# Patient Record
Sex: MALE | Race: WHITE | URBAN - NONMETROPOLITAN AREA
[De-identification: names, ages, dates, MRNs, and addresses within clinical notes are randomized per-mention and may not be internally consistent; named-entity substitution may affect disease eponyms.]

---

## 2023-03-01 ENCOUNTER — OUT OF OFFICE VISIT (OUTPATIENT)
Dept: URBAN - NONMETROPOLITAN AREA MEDICAL CENTER 3 | Facility: MEDICAL CENTER | Age: 53
End: 2023-03-01
Payer: COMMERCIAL

## 2023-03-01 DIAGNOSIS — E80.6 ABNORMAL BILIRUBIN TEST: ICD-10-CM

## 2023-03-01 DIAGNOSIS — D64.89 ANEMIA DUE TO OTHER CAUSE: ICD-10-CM

## 2023-03-01 DIAGNOSIS — K70.31 ALCOHOLIC CIRRHOSIS OF LIVER WITH ASCITES: ICD-10-CM

## 2023-03-01 DIAGNOSIS — K92.1 ACUTE MELENA: ICD-10-CM

## 2023-03-01 PROCEDURE — 99223 1ST HOSP IP/OBS HIGH 75: CPT | Performed by: STUDENT IN AN ORGANIZED HEALTH CARE EDUCATION/TRAINING PROGRAM

## 2023-03-01 PROCEDURE — G8427 DOCREV CUR MEDS BY ELIG CLIN: HCPCS | Performed by: STUDENT IN AN ORGANIZED HEALTH CARE EDUCATION/TRAINING PROGRAM

## 2023-03-02 ENCOUNTER — OUT OF OFFICE VISIT (OUTPATIENT)
Dept: URBAN - NONMETROPOLITAN AREA MEDICAL CENTER 3 | Facility: MEDICAL CENTER | Age: 53
End: 2023-03-02
Payer: COMMERCIAL

## 2023-03-02 DIAGNOSIS — D64.89 ANEMIA DUE TO OTHER CAUSE: ICD-10-CM

## 2023-03-02 DIAGNOSIS — K70.11 ALCOHOLIC HEPATITIS WITH ASCITES: ICD-10-CM

## 2023-03-02 DIAGNOSIS — K70.31 ALCOHOLIC CIRRHOSIS OF LIVER WITH ASCITES: ICD-10-CM

## 2023-03-02 DIAGNOSIS — K92.1 ACUTE MELENA: ICD-10-CM

## 2023-03-02 PROCEDURE — 99232 SBSQ HOSP IP/OBS MODERATE 35: CPT | Performed by: STUDENT IN AN ORGANIZED HEALTH CARE EDUCATION/TRAINING PROGRAM

## 2023-03-04 ENCOUNTER — OUT OF OFFICE VISIT (OUTPATIENT)
Dept: URBAN - NONMETROPOLITAN AREA MEDICAL CENTER 3 | Facility: MEDICAL CENTER | Age: 53
End: 2023-03-04
Payer: COMMERCIAL

## 2023-03-04 DIAGNOSIS — K70.31 ALCOHOLIC CIRRHOSIS OF LIVER WITH ASCITES: ICD-10-CM

## 2023-03-04 DIAGNOSIS — D64.89 ANEMIA DUE TO OTHER CAUSE: ICD-10-CM

## 2023-03-04 DIAGNOSIS — K92.1 ACUTE MELENA: ICD-10-CM

## 2023-03-04 DIAGNOSIS — K70.11 ALCOHOLIC HEPATITIS WITH ASCITES: ICD-10-CM

## 2023-03-04 PROCEDURE — 99232 SBSQ HOSP IP/OBS MODERATE 35: CPT | Performed by: STUDENT IN AN ORGANIZED HEALTH CARE EDUCATION/TRAINING PROGRAM

## 2023-03-06 ENCOUNTER — OUT OF OFFICE VISIT (OUTPATIENT)
Dept: URBAN - NONMETROPOLITAN AREA MEDICAL CENTER 3 | Facility: MEDICAL CENTER | Age: 53
End: 2023-03-06
Payer: COMMERCIAL

## 2023-03-06 DIAGNOSIS — D64.89 ANEMIA DUE TO OTHER CAUSE: ICD-10-CM

## 2023-03-06 DIAGNOSIS — K70.11 ALCOHOLIC HEPATITIS WITH ASCITES: ICD-10-CM

## 2023-03-06 DIAGNOSIS — I85.10 ESOPH VARICE OTHER DIS: ICD-10-CM

## 2023-03-06 DIAGNOSIS — K70.31 ALCOHOLIC CIRRHOSIS OF LIVER WITH ASCITES: ICD-10-CM

## 2023-03-06 PROCEDURE — 99232 SBSQ HOSP IP/OBS MODERATE 35: CPT | Performed by: INTERNAL MEDICINE

## 2023-03-07 ENCOUNTER — OUT OF OFFICE VISIT (OUTPATIENT)
Dept: URBAN - NONMETROPOLITAN AREA MEDICAL CENTER 3 | Facility: MEDICAL CENTER | Age: 53
End: 2023-03-07
Payer: COMMERCIAL

## 2023-03-07 DIAGNOSIS — K76.6 CLINICALLY SIGNIFICANT PORTAL HYPERTENSION: ICD-10-CM

## 2023-03-07 DIAGNOSIS — I85.10 ESOPH VARICE OTHER DIS: ICD-10-CM

## 2023-03-07 DIAGNOSIS — K31.89 ACQUIRED DEFORMITY OF DUODENUM: ICD-10-CM

## 2023-03-07 PROCEDURE — 43235 EGD DIAGNOSTIC BRUSH WASH: CPT | Performed by: INTERNAL MEDICINE

## 2023-03-21 ENCOUNTER — TELEPHONE ENCOUNTER (OUTPATIENT)
Dept: URBAN - NONMETROPOLITAN AREA CLINIC 4 | Facility: CLINIC | Age: 53
End: 2023-03-21

## 2023-03-27 ENCOUNTER — TELEPHONE ENCOUNTER (OUTPATIENT)
Dept: URBAN - NONMETROPOLITAN AREA CLINIC 4 | Facility: CLINIC | Age: 53
End: 2023-03-27

## 2023-03-28 ENCOUNTER — WEB ENCOUNTER (OUTPATIENT)
Dept: URBAN - NONMETROPOLITAN AREA CLINIC 4 | Facility: CLINIC | Age: 53
End: 2023-03-28

## 2023-03-28 ENCOUNTER — OFFICE VISIT (OUTPATIENT)
Dept: URBAN - NONMETROPOLITAN AREA CLINIC 4 | Facility: CLINIC | Age: 53
End: 2023-03-28
Payer: COMMERCIAL

## 2023-03-28 ENCOUNTER — LAB OUTSIDE AN ENCOUNTER (OUTPATIENT)
Dept: URBAN - NONMETROPOLITAN AREA CLINIC 4 | Facility: CLINIC | Age: 53
End: 2023-03-28

## 2023-03-28 VITALS
WEIGHT: 249 LBS | DIASTOLIC BLOOD PRESSURE: 85 MMHG | HEIGHT: 71 IN | TEMPERATURE: 97.7 F | HEART RATE: 106 BPM | BODY MASS INDEX: 34.86 KG/M2 | SYSTOLIC BLOOD PRESSURE: 132 MMHG

## 2023-03-28 DIAGNOSIS — K70.11 ALCOHOLIC HEPATITIS WITH ASCITES: ICD-10-CM

## 2023-03-28 DIAGNOSIS — I85.00 ESOPHAGEAL VARICES WITHOUT BLEEDING, UNSPECIFIED ESOPHAGEAL VARICES TYPE: ICD-10-CM

## 2023-03-28 DIAGNOSIS — K70.31 ALCOHOLIC CIRRHOSIS OF LIVER WITH ASCITES: ICD-10-CM

## 2023-03-28 PROBLEM — 14223005: Status: ACTIVE | Noted: 2023-03-28

## 2023-03-28 PROBLEM — 420054005: Status: ACTIVE | Noted: 2023-03-28

## 2023-03-28 PROBLEM — 1082611000119101: Status: ACTIVE | Noted: 2023-03-28

## 2023-03-28 PROCEDURE — 99215 OFFICE O/P EST HI 40 MIN: CPT | Performed by: PHYSICIAN ASSISTANT

## 2023-03-28 RX ORDER — PANTOPRAZOLE SODIUM 40 MG/1
1 TABLET TABLET, DELAYED RELEASE ORAL ONCE A DAY
Qty: 30 TABLET | Refills: 5

## 2023-03-28 RX ORDER — PREDNISOLONE ORAL 15 MG/5ML
5 ML IN THE MORNING WITH FOOD OR MILK SOLUTION ORAL ONCE A DAY
Status: ACTIVE | COMMUNITY

## 2023-03-28 RX ORDER — SPIRONOLACTONE 50 MG/1
1 TABLET TABLET, FILM COATED ORAL ONCE A DAY
Status: ACTIVE | COMMUNITY

## 2023-03-28 RX ORDER — PANTOPRAZOLE SODIUM 40 MG/1
1 TABLET TABLET, DELAYED RELEASE ORAL ONCE A DAY
Status: ACTIVE | COMMUNITY

## 2023-03-28 RX ORDER — PREDNISOLONE ORAL 15 MG/5ML
10.125 ML ONCE A DAY FOR 7 DAYS, 6.75 ML ONCE A DAY FOR 7 DAYS, 3.375 ML ONCE A DAY FOR 7 DAYS SOLUTION ORAL
Qty: 141.75 ML | OUTPATIENT
Start: 2023-03-28 | End: 2023-04-27

## 2023-03-28 RX ORDER — FUROSEMIDE 40 MG/1
1 TABLET TABLET ORAL ONCE A DAY
Qty: 30 | OUTPATIENT
Start: 2023-03-28

## 2023-03-28 RX ORDER — FUROSEMIDE 20 MG/1
1 TABLET TABLET ORAL ONCE A DAY
Status: ACTIVE | COMMUNITY

## 2023-03-28 RX ORDER — SPIRONOLACTONE 100 MG/1
1 TABLET TABLET, FILM COATED ORAL ONCE A DAY
Qty: 30 | OUTPATIENT
Start: 2023-03-28 | End: 2023-04-27

## 2023-03-28 NOTE — HPI-TODAY'S VISIT:
Patient seen at [facility] in March 2023 for abdominal distention and new onset jaundice.  Noted to have cirrhosis and alcoholic hepatitis.  Patient qualified for steroids and Lille score showed improvement and patient sent home with Orapred.  Patient underwent 5 L removal paracentesis.  Underwent EGD for anemia that noted small esophageal varices, no gastric varices, and moderate hypertensive portal gastropathy. No previous colonoscopy.

## 2023-03-28 NOTE — PHYSICAL EXAM GASTROINTESTINAL
Abdomen , soft, nontender, Distention noted with suspected large volume ascites , no guarding or rigidity , umbilical hernia

## 2023-03-28 NOTE — PHYSICAL EXAM MUSCULOSKELETAL:
normal gait and station , no tenderness or deformities present. Muscle wasting noted to all extremities worse in the upper extremities

## 2023-03-28 NOTE — PHYSICAL EXAM CONSTITUTIONAL:
well developed, Appears slightly malnourished , in no acute distress , ambulating without difficulty , normal communication ability

## 2023-03-29 LAB
A/G RATIO: 1.1
ABSOLUTE BASOPHILS: 37
ABSOLUTE EOSINOPHILS: 0
ABSOLUTE LYMPHOCYTES: 281
ABSOLUTE MONOCYTES: 692
ABSOLUTE NEUTROPHILS: (no result)
ALBUMIN: 3.4
ALKALINE PHOSPHATASE: 105
ALT (SGPT): 54
AST (SGOT): 52
BASOPHILS: 0.2
BILIRUBIN, TOTAL: 9
BUN/CREATININE RATIO: (no result)
BUN: 18
CALCIUM: 9.4
CARBON DIOXIDE, TOTAL: 24
CHLORIDE: 93
COMMENT(S): (no result)
CREATININE: 0.86
EGFR: 103
EOSINOPHILS: 0
GLOBULIN, TOTAL: 3.2
GLUCOSE: 202
HEMOGLOBIN: 11.1
INR: 1.8
LYMPHOCYTES: 1.5
MONOCYTES: 3.7
NEUTROPHILS: 94.6
PLATELET COUNT: (no result)
POTASSIUM: 4.4
PROTEIN, TOTAL: 6.6
PT: 17.8
RED BLOOD CELL COUNT: (no result)
SODIUM: 128
WHITE BLOOD CELL COUNT: 18.7

## 2023-03-30 ENCOUNTER — TELEPHONE ENCOUNTER (OUTPATIENT)
Dept: URBAN - METROPOLITAN AREA CLINIC 54 | Facility: CLINIC | Age: 53
End: 2023-03-30

## 2023-04-14 ENCOUNTER — TELEPHONE ENCOUNTER (OUTPATIENT)
Dept: URBAN - NONMETROPOLITAN AREA CLINIC 4 | Facility: CLINIC | Age: 53
End: 2023-04-14

## 2023-04-21 ENCOUNTER — TELEPHONE ENCOUNTER (OUTPATIENT)
Dept: URBAN - METROPOLITAN AREA CLINIC 86 | Facility: CLINIC | Age: 53
End: 2023-04-21

## 2023-04-21 ENCOUNTER — DASHBOARD ENCOUNTERS (OUTPATIENT)
Age: 53
End: 2023-04-21

## 2023-04-21 ENCOUNTER — LAB OUTSIDE AN ENCOUNTER (OUTPATIENT)
Dept: URBAN - METROPOLITAN AREA CLINIC 86 | Facility: CLINIC | Age: 53
End: 2023-04-21

## 2023-04-21 ENCOUNTER — WEB ENCOUNTER (OUTPATIENT)
Dept: URBAN - METROPOLITAN AREA CLINIC 86 | Facility: CLINIC | Age: 53
End: 2023-04-21

## 2023-04-21 ENCOUNTER — OFFICE VISIT (OUTPATIENT)
Dept: URBAN - METROPOLITAN AREA CLINIC 86 | Facility: CLINIC | Age: 53
End: 2023-04-21
Payer: COMMERCIAL

## 2023-04-21 VITALS
BODY MASS INDEX: 28.28 KG/M2 | HEART RATE: 109 BPM | DIASTOLIC BLOOD PRESSURE: 67 MMHG | HEIGHT: 71 IN | WEIGHT: 202 LBS | SYSTOLIC BLOOD PRESSURE: 103 MMHG | TEMPERATURE: 96.7 F

## 2023-04-21 DIAGNOSIS — Z71.85 VACCINE COUNSELING: ICD-10-CM

## 2023-04-21 DIAGNOSIS — E87.1 HYPONATREMIA: ICD-10-CM

## 2023-04-21 DIAGNOSIS — I85.00 ESOPHAGEAL VARICES WITHOUT BLEEDING, UNSPECIFIED ESOPHAGEAL VARICES TYPE: ICD-10-CM

## 2023-04-21 DIAGNOSIS — K70.11 ALCOHOLIC HEPATITIS WITH ASCITES: ICD-10-CM

## 2023-04-21 DIAGNOSIS — K70.31 ALCOHOLIC CIRRHOSIS OF LIVER WITH ASCITES: ICD-10-CM

## 2023-04-21 PROBLEM — 35400008: Status: ACTIVE | Noted: 2023-04-21

## 2023-04-21 PROBLEM — 443913008: Status: ACTIVE | Noted: 2023-04-21

## 2023-04-21 PROBLEM — 89627008: Status: ACTIVE | Noted: 2023-04-21

## 2023-04-21 PROCEDURE — 99214 OFFICE O/P EST MOD 30 MIN: CPT | Performed by: PHYSICIAN ASSISTANT

## 2023-04-21 RX ORDER — PREDNISOLONE ORAL 15 MG/5ML
10.125 ML ONCE A DAY FOR 7 DAYS, 6.75 ML ONCE A DAY FOR 7 DAYS, 3.375 ML ONCE A DAY FOR 7 DAYS SOLUTION ORAL
Qty: 141.75 ML | Status: ON HOLD | COMMUNITY
Start: 2023-03-28 | End: 2023-04-27

## 2023-04-21 RX ORDER — PREDNISOLONE ORAL 15 MG/5ML
5 ML IN THE MORNING WITH FOOD OR MILK SOLUTION ORAL ONCE A DAY
Status: ON HOLD | COMMUNITY

## 2023-04-21 RX ORDER — AZITHROMYCIN 250 MG/1
TAKE TWO TABLETS BY MOUTH ON DAY 1, THEN TAKE ONE TABLET ONE TIME DAILY ON DAYS 2-5 TABLET, FILM COATED ORAL
Qty: 6 UNSPECIFIED | Refills: 0 | Status: ACTIVE | COMMUNITY

## 2023-04-21 RX ORDER — FUROSEMIDE 20 MG/1
1 TABLET TABLET ORAL ONCE A DAY
COMMUNITY

## 2023-04-21 RX ORDER — PANTOPRAZOLE SODIUM 40 MG/1
1 TABLET TABLET, DELAYED RELEASE ORAL ONCE A DAY
Qty: 30 TABLET | Refills: 5 | COMMUNITY

## 2023-04-21 RX ORDER — LACTULOSE 10 G/15ML
15 ML AS NEEDED TO TITRATE TO 2-3 BOWEL MOVEMENTS A DAY SOLUTION ORAL
Qty: 1350 MILLILITER | Refills: 3 | OUTPATIENT
Start: 2023-04-21 | End: 2023-08-19

## 2023-04-21 RX ORDER — FUROSEMIDE 40 MG/1
1 TABLET TABLET ORAL ONCE A DAY
Qty: 30 | Status: ON HOLD | COMMUNITY
Start: 2023-03-28

## 2023-04-21 RX ORDER — CHLOPHEDIANOL HCL AND PYRILAMINE MALEATE 12.5; 12.5 MG/5ML; MG/5ML
TAKE 10 MILLILITERS BY MOUTH EVERY SIX TO EIGHT HOURS AS NEEDED FOR COUGH FOR 7 DAYS SOLUTION ORAL
Qty: 241 UNSPECIFIED | Refills: 0 | Status: ACTIVE | COMMUNITY

## 2023-04-21 RX ORDER — SPIRONOLACTONE 100 MG/1
1 TABLET TABLET, FILM COATED ORAL ONCE A DAY
Qty: 30 | COMMUNITY
Start: 2023-03-28 | End: 2023-04-27

## 2023-04-21 RX ORDER — SPIRONOLACTONE 50 MG/1
1 TABLET TABLET, FILM COATED ORAL ONCE A DAY
COMMUNITY

## 2023-04-21 NOTE — HPI-TODAY'S VISIT:
Ordered labs today at [place] and patient went to have them done therefore this allowed his epic from the [facility] to connect with ours.  In the hospital he was found to have a bilirubin of 11.9, AST 2 3, ALT 83, sodium of 119 on admission.  INR 2.9.  DF score at admission was over 100 and was put on steroids with good response.  He was supposed to finish these on April 27 but stopped them 5 days ago.  He had a paracentesis on March 2 with 5 L removed and a second paracentesis on the fifth while in the hospital with 7 and half liters removed.  He received treatment as well.  He was discharged with a sodium of 133.  He was discharged on prednisolone. he had an EGD with esophageal varices.  He was discharged on furosemide 20 mg and Aldactone 50 mg.  He had an acute hepatitis panel done and that was negative.  He had hemochromatosis testing done and he has 1 copy of the C282Y variant and one copy of the H63D variant as well.  His ferritin in the hospital was 3318.3.  He was recently treated for bronchitis and is taking azithromycin.

## 2023-04-21 NOTE — HPI-TODAY'S VISIT:
Pt is a 53 year old male who presents today for evaluation of cirrhosis and ascites. He was previously seeing   4/21/23 visit with wife 4/21/23 visit and records reviewed at time of visit as unfortunately the patient was scheduled after hours and all records not available He was diagnosed with alcoholic hepatitis and had a MDF score of over 100 upon admit.  He was put on 40 mg of steroids.  On day 7 his Brooke score was 0.159 and he was recommended to they continue the steroids for 28 days.  He stopped the steroids 5 days ago.  He did not like the side effects.  He had a right upper quadrant ultrasound showing cirrhosis and an MRCP that did not show any lesions or clot.  It did not see any common bile duct stones either.  He had an EGD done and there were small varices.  He had labs done with his primary care on March 20 and the glucose 155, creatinine 0.9, sodium 133, potassium 4.7, bilirubin 7.7, alkaline phosphatase 125, AST 56, ALT 47.  He had labs done again and this was on March 28 and glucose 202, sodium 128, potassium 4.4, bilirubin 9, alkaline phosphatase 105, AST 52, ALT 54.  White blood cells elevated at 18, hemoglobin 11, neutrophils 1700, platelets were not calculated.  INR 1.8.  MELD 21.  Asked about medications and he is realy not sure what he is taking. He was jaun he stopped the prednisone. He is taking azithromcycin for bronchitis. Discussed we need to update labs and check an ammonia as not having the 3 BMs and counseling on this. The low sodium is concerning in his history and may not be able to tolerate the diuretics EGD UTD Also discused need to see what labs done in hospital as do not want to repeat    recap from previous visit with the GI. Patient seen at [facility] in March 2023 for abdominal distention and new onset jaundice.   Noted to have cirrhosis and alcoholic hepatitis.  Patient qualified for steroids and Lille score showed improvement and patient sent home with Orapred.  Patient underwent 5 L removal paracentesis.  Underwent EGD for anemia that noted small esophageal varices, no gastric varices, and moderate hypertensive portal gastropathy. No previous colonoscopy.

## 2023-04-24 ENCOUNTER — OUT OF OFFICE VISIT (OUTPATIENT)
Dept: URBAN - NONMETROPOLITAN AREA MEDICAL CENTER 3 | Facility: MEDICAL CENTER | Age: 53
End: 2023-04-24
Payer: COMMERCIAL

## 2023-04-24 ENCOUNTER — LAB OUTSIDE AN ENCOUNTER (OUTPATIENT)
Dept: URBAN - METROPOLITAN AREA CLINIC 86 | Facility: CLINIC | Age: 53
End: 2023-04-24

## 2023-04-24 ENCOUNTER — TELEPHONE ENCOUNTER (OUTPATIENT)
Dept: URBAN - METROPOLITAN AREA CLINIC 86 | Facility: CLINIC | Age: 53
End: 2023-04-24

## 2023-04-24 DIAGNOSIS — K76.82 ACUTE HEPATIC ENCEPHALOPATHY: ICD-10-CM

## 2023-04-24 DIAGNOSIS — K70.31 ALCOHOLIC CIRRHOSIS OF LIVER WITH ASCITES: ICD-10-CM

## 2023-04-24 DIAGNOSIS — E87.1 ACUTE HYPONATREMIA: ICD-10-CM

## 2023-04-24 LAB
A/G RATIO: 0.7
ALBUMIN: 2.5
ALKALINE PHOSPHATASE: 135
ALT (SGPT): 90
AST (SGOT): 112
BANDS: 2
BASO (ABSOLUTE): 0
BASOS: 0
BILIRUBIN, TOTAL: 9.7
BLASTS/BLAST LIKE CELLS: (no result)
BUN/CREATININE RATIO: 25
BUN: 29
CALCIUM: 8.8
CARBON DIOXIDE, TOTAL: 21
CHLORIDE: 83
CREATININE: 1.17
EGFR: 74
EOS (ABSOLUTE): 0.2
EOS: 1
GLOBULIN, TOTAL: 3.8
GLUCOSE: 124
HEMATOCRIT: 33.9
HEMATOLOGY COMMENTS:: (no result)
HEMOGLOBIN: 12.9
IMMATURE CELLS: (no result)
IMMATURE GRANS (ABS): (no result)
IMMATURE GRANULOCYTES: (no result)
INR: 1.9
LYMPHS (ABSOLUTE): 0.5
LYMPHS: 3
MCH: 36.8
MCHC: 38.1
MCV: 97
MEGAKARYOCYTES: (no result)
METAMYELOCYTES: (no result)
MONOCYTES(ABSOLUTE): 1.3
MONOCYTES: 8
MYELOCYTES: 3
NEUTROPHILS (ABSOLUTE): 13.9
NEUTROPHILS: 83
NRBC: 7
OTHER, LINEAGE UNCERTAIN: (no result)
PLATELETS: (no result)
POTASSIUM: 4.6
PROMYELOCYTES: (no result)
PROTEIN, TOTAL: 6.3
PROTHROMBIN TIME: 18.6
RBC: 3.51
RDW: 13.3
SODIUM: 116
WBC: 16.4

## 2023-04-24 PROCEDURE — 99233 SBSQ HOSP IP/OBS HIGH 50: CPT | Performed by: INTERNAL MEDICINE

## 2023-04-25 ENCOUNTER — TELEPHONE ENCOUNTER (OUTPATIENT)
Dept: URBAN - METROPOLITAN AREA CLINIC 86 | Facility: CLINIC | Age: 53
End: 2023-04-25

## 2023-04-25 ENCOUNTER — OFFICE VISIT (OUTPATIENT)
Dept: URBAN - NONMETROPOLITAN AREA CLINIC 4 | Facility: CLINIC | Age: 53
End: 2023-04-25

## 2023-05-03 ENCOUNTER — OFFICE VISIT (OUTPATIENT)
Dept: URBAN - METROPOLITAN AREA CLINIC 86 | Facility: CLINIC | Age: 53
End: 2023-05-03

## 2023-05-04 ENCOUNTER — TELEPHONE ENCOUNTER (OUTPATIENT)
Dept: URBAN - METROPOLITAN AREA CLINIC 86 | Facility: CLINIC | Age: 53
End: 2023-05-04

## 2023-05-05 ENCOUNTER — LAB OUTSIDE AN ENCOUNTER (OUTPATIENT)
Dept: URBAN - METROPOLITAN AREA CLINIC 86 | Facility: CLINIC | Age: 53
End: 2023-05-05

## 2023-05-10 ENCOUNTER — OFFICE VISIT (OUTPATIENT)
Dept: URBAN - METROPOLITAN AREA CLINIC 54 | Facility: CLINIC | Age: 53
End: 2023-05-10